# Patient Record
Sex: FEMALE | Race: WHITE
[De-identification: names, ages, dates, MRNs, and addresses within clinical notes are randomized per-mention and may not be internally consistent; named-entity substitution may affect disease eponyms.]

---

## 2017-10-02 ENCOUNTER — HOSPITAL ENCOUNTER (OUTPATIENT)
Dept: HOSPITAL 53 - M OPP | Age: 42
Discharge: HOME | End: 2017-10-02
Attending: INTERNAL MEDICINE
Payer: COMMERCIAL

## 2017-10-02 VITALS — DIASTOLIC BLOOD PRESSURE: 69 MMHG | SYSTOLIC BLOOD PRESSURE: 117 MMHG

## 2017-10-02 VITALS — HEIGHT: 64 IN | BODY MASS INDEX: 23.9 KG/M2 | WEIGHT: 140 LBS

## 2017-10-02 DIAGNOSIS — Z79.899: ICD-10-CM

## 2017-10-02 DIAGNOSIS — R19.4: Primary | ICD-10-CM

## 2017-10-02 DIAGNOSIS — Q43.8: ICD-10-CM

## 2017-10-02 DIAGNOSIS — R12: ICD-10-CM

## 2017-10-02 DIAGNOSIS — R06.83: ICD-10-CM

## 2017-10-02 DIAGNOSIS — K59.00: ICD-10-CM

## 2017-10-02 DIAGNOSIS — D12.4: ICD-10-CM

## 2017-10-02 NOTE — ROOR
________________________________________________________________________________

Patient Name: Annmarie Bowers           Procedure Date: 10/2/2017 11:08 AM

MRN: [id number]                          Account Number: [id number]

YOB: 1975               Age: 42

Room: [facility]                            Gender: Female

Note Status: Finalized                 

________________________________________________________________________________

 

Procedure:           Colonoscopy

Indications:         Change in bowel habits, Constipation

Providers:           Silvano Arguello MD

Referring MD:        ZAINAB MOREAU MD

Requesting Provider: 

Medicines:           Monitored Anesthesia Care

Complications:       No immediate complications.

________________________________________________________________________________

Procedure:           Pre-Anesthesia Assessment:

                     - Prior to the procedure, a History and Physical was 

                     performed, and patient medications and allergies were 

                     reviewed. The patient is competent. The risks and 

                     benefits of the procedure and the sedation options and 

                     risks were discussed with the patient. All questions were 

                     answered and informed consent was obtained. Patient 

                     identification and proposed procedure were verified by 

                     the physician, the nurse and the anesthetist in the 

                     procedure room. Mental Status Examination: alert and 

                     oriented. Airway Examination: normal oropharyngeal airway 

                     and neck mobility. Respiratory Examination: clear to 

                     auscultation. CV Examination: normal. Prophylactic 

                     Antibiotics: The patient does not require prophylactic 

                     antibiotics. Prior Anticoagulants: The patient has taken 

                     no previous anticoagulant or antiplatelet agents. ASA 

                     Grade Assessment: II - A patient with mild systemic 

                     disease. After reviewing the risks and benefits, the 

                     patient was deemed in satisfactory condition to undergo 

                     the procedure. The anesthesia plan was to use monitored 

                     anesthesia care (MAC). Immediately prior to 

                     administration of medications, the patient was 

                     re-assessed for adequacy to receive sedatives. The heart 

                     rate, respiratory rate, oxygen saturations, blood 

                     pressure, adequacy of pulmonary ventilation, and response 

                     to care were monitored throughout the procedure. The 

                     physical status of the patient was re-assessed after the 

                     procedure.

                     The Colonoscope was introduced through the anus and 

                     advanced to the terminal ileum, with identification of 

                     the appendiceal orifice and IC valve. The colonoscopy was 

                     performed without difficulty. The patient tolerated the 

                     procedure well. The quality of the bowel preparation was 

                     good. The terminal ileum, ileocecal valve, appendiceal 

                     orifice, and rectum were photographed. Scope insertion 

                     time was 3 minutes. Scope withdrawal time was 10 minutes. 

                     The total duration of the procedure was 15 minutes.

                                                                                

Findings:

     The perianal and digital rectal examinations were normal.

     A 4 mm polyp was found in the descending colon. The polyp was sessile. 

     The polyp was removed with a cold biopsy forceps. Resection and retrieval 

     were complete. Verification of patient identification for the specimen 

     was done by the physician and nurse using the patient's name, birth date 

     and medical record number. Estimated blood loss was minimal.

     The colon (entire examined portion) was moderately tortuous.

     No other significant abnormalities were identified in a careful 

     examination of the remainder of the colon.

     The terminal ileum appeared normal.

                                                                                

Impression:          - One 4 mm polyp in the descending colon, removed with a 

                     cold biopsy forceps. Resected and retrieved.

                     - Tortuous colon.

                     - The examined portion of the ileum was normal.

Recommendation:      - Patient has a contact number available for emergencies. 

                     The signs and symptoms of potential delayed complications 

                     were discussed with the patient. Return to normal 

                     activities tomorrow. Written discharge instructions were 

                     provided to the patient.

                     - Resume previous diet.

                     - Continue present medications.

                     - Await pathology results.

                     - Repeat colonoscopy in 5-10 years for surveillance based 

                     on pathology results.

                     - Return to GI clinic as previously scheduled on 

                     10/9/2017 at 11:15 AM.

                     - Return to primary care physician.

                                                                                

 

Silvano Arguello MD

_______________________

Silvano Arguello MD

10/2/2017 11:41:42 AM

This report has been signed electronically.

Number of Addenda: 0

 

Note Initiated On: 10/2/2017 11:08 AM

Estimated Blood Loss:

     Estimated blood loss was minimal.

## 2019-04-22 ENCOUNTER — HOSPITAL ENCOUNTER (OUTPATIENT)
Dept: HOSPITAL 53 - M LAB LCGH | Age: 44
End: 2019-04-22
Attending: OBSTETRICS & GYNECOLOGY
Payer: COMMERCIAL

## 2019-04-22 DIAGNOSIS — Z12.4: Primary | ICD-10-CM

## 2019-04-22 PROCEDURE — 87624 HPV HI-RISK TYP POOLED RSLT: CPT

## 2019-04-24 LAB — HPV LOW VOL RFLX: (no result)

## 2024-08-08 ENCOUNTER — HOSPITAL ENCOUNTER (OUTPATIENT)
Dept: HOSPITAL 53 - M SFHCDERM | Age: 49
End: 2024-08-08
Attending: PHYSICIAN ASSISTANT
Payer: COMMERCIAL

## 2024-08-08 DIAGNOSIS — L72.9: Primary | ICD-10-CM
